# Patient Record
Sex: MALE | Race: OTHER | HISPANIC OR LATINO | URBAN - METROPOLITAN AREA
[De-identification: names, ages, dates, MRNs, and addresses within clinical notes are randomized per-mention and may not be internally consistent; named-entity substitution may affect disease eponyms.]

---

## 2018-09-28 ENCOUNTER — EMERGENCY (EMERGENCY)
Facility: HOSPITAL | Age: 38
LOS: 1 days | Discharge: HOME | End: 2018-09-28
Attending: STUDENT IN AN ORGANIZED HEALTH CARE EDUCATION/TRAINING PROGRAM
Payer: SELF-PAY

## 2018-09-28 VITALS
SYSTOLIC BLOOD PRESSURE: 132 MMHG | RESPIRATION RATE: 18 BRPM | TEMPERATURE: 98 F | HEART RATE: 59 BPM | DIASTOLIC BLOOD PRESSURE: 76 MMHG | OXYGEN SATURATION: 98 %

## 2018-09-28 VITALS — HEIGHT: 64 IN | WEIGHT: 151.9 LBS

## 2018-09-28 PROCEDURE — 99283 EMERGENCY DEPT VISIT LOW MDM: CPT

## 2018-09-28 PROCEDURE — 99283 EMERGENCY DEPT VISIT LOW MDM: CPT | Mod: 25

## 2018-09-28 PROCEDURE — 99053 MED SERV 10PM-8AM 24 HR FAC: CPT

## 2018-09-28 RX ORDER — IBUPROFEN 200 MG
600 TABLET ORAL ONCE
Qty: 0 | Refills: 0 | Status: COMPLETED | OUTPATIENT
Start: 2018-09-28 | End: 2018-09-28

## 2018-09-28 RX ADMIN — Medication 600 MILLIGRAM(S): at 23:51
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
## 2018-09-28 NOTE — ED ADULT NURSE NOTE - NSIMPLEMENTINTERV_GEN_ALL_ED
Implemented All Universal Safety Interventions:  [place] to call system. Call bell, personal items and telephone within reach. Instruct patient to call for assistance. Room bathroom lighting operational. Non-slip footwear when patient is off stretcher. Physically safe environment: no spills, clutter or unnecessary equipment. Stretcher in lowest position, wheels locked, appropriate side rails in place.
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
## 2018-09-28 NOTE — ED PROVIDER NOTE - MUSCULOSKELETAL, MLM
No midline TTP. Cervical spine TTP. Parathoracic TTP. Lumbar TTP. No midline TTP. Cervical spine TTP. Paraspinal TTP. Lumbar TTP. FROM of neck. No midline TTP. Cervical spine TTP. Paraspinal TTP. Lumbar TTP.

## 2019-04-07 ENCOUNTER — EMERGENCY (EMERGENCY)
Facility: HOSPITAL | Age: 39
LOS: 1 days | Discharge: HOME | End: 2019-04-07
Attending: EMERGENCY MEDICINE
Payer: SELF-PAY

## 2019-04-07 VITALS
DIASTOLIC BLOOD PRESSURE: 68 MMHG | TEMPERATURE: 98 F | HEIGHT: 65 IN | WEIGHT: 151.9 LBS | SYSTOLIC BLOOD PRESSURE: 107 MMHG | OXYGEN SATURATION: 98 % | HEART RATE: 59 BPM | RESPIRATION RATE: 18 BRPM

## 2019-04-07 PROCEDURE — 99283 EMERGENCY DEPT VISIT LOW MDM: CPT

## 2019-04-07 PROCEDURE — 90715 TDAP VACCINE 7 YRS/> IM: CPT

## 2019-04-07 PROCEDURE — 90471 IMMUNIZATION ADMIN: CPT

## 2019-04-07 PROCEDURE — T1013: CPT

## 2019-04-07 PROCEDURE — 99283 EMERGENCY DEPT VISIT LOW MDM: CPT | Mod: 25

## 2019-04-07 RX ORDER — TETANUS TOXOID, REDUCED DIPHTHERIA TOXOID AND ACELLULAR PERTUSSIS VACCINE, ADSORBED 5; 2.5; 8; 8; 2.5 [IU]/.5ML; [IU]/.5ML; UG/.5ML; UG/.5ML; UG/.5ML
0.5 SUSPENSION INTRAMUSCULAR ONCE
Qty: 0 | Refills: 0 | Status: COMPLETED | OUTPATIENT
Start: 2019-04-07 | End: 2019-04-07

## 2019-04-07 RX ADMIN — Medication 1 TABLET(S): at 22:54

## 2019-04-07 RX ADMIN — TETANUS TOXOID, REDUCED DIPHTHERIA TOXOID AND ACELLULAR PERTUSSIS VACCINE, ADSORBED 0.5 MILLILITER(S): 5; 2.5; 8; 8; 2.5 SUSPENSION INTRAMUSCULAR at 22:54

## 2019-04-07 NOTE — ED PROVIDER NOTE - ATTENDING CONTRIBUTION TO CARE
seen with acp: well appearing, NAD, left first digit with superficial abrasion to dorsum of hand; neurovasc intact; no flexor or extensor tendon involvement; agree with acp plan of care

## 2019-04-07 NOTE — ED PROVIDER NOTE - PHYSICAL EXAMINATION
Const: Awake, alert and oriented. In no acute distress. Well appearing.  HEENT: NC/AT. Moist mucous membranes.  Eyes: No scleral icterus. EOMI.  Neck:. Soft and supple. Full ROM without pain.  Cardiac:  +S1/S2. No murmurs. Peripheral pulses 2+ and symmetric. No LE edema.  Resp: Speaking in full sentences. No evidence of respiratory distress. No wheezes, rales or rhonchi.  Abd: Soft, non-tender, non-distended. Normal bowel sounds in all 4 quadrants. No guarding or rebound.  Back: Spine midline and non-tender. No CVAT.  MSK: FROM in all extremities, neurovasculary intact, radial pulse 2+, hand  5/5   Skin: Superificial animal bite noted to left 1st digit, no tendon involvement   Lymph: No cervical lymphadenopathy.  Neuro: Awake, alert & oriented x 3. Moves all extremities symmetrically.

## 2019-04-07 NOTE — ED PROVIDER NOTE - OBJECTIVE STATEMENT
Patient is a 39 y/o male presenting for animal bite. Patient states was bit by a neighborhood dog, states owner stated patient is up to date with vaccines, dog can be observed. Patient admits to animal bite on left 1st digit. Patient is not up to date with tetanus. Patient denies numbness or loss of sensation.

## 2019-04-07 NOTE — ED PROVIDER NOTE - CLINICAL SUMMARY MEDICAL DECISION MAKING FREE TEXT BOX
Wound care explained to patient, copious irrigation, augmentin, tetanus, dog bite form, explained to patient signs of infection, pt explained results and d/c instructions

## 2022-11-06 ENCOUNTER — EMERGENCY (EMERGENCY)
Facility: HOSPITAL | Age: 42
LOS: 1 days | Discharge: HOME | End: 2022-11-06
Attending: EMERGENCY MEDICINE
Payer: SELF-PAY

## 2022-11-06 VITALS
DIASTOLIC BLOOD PRESSURE: 81 MMHG | TEMPERATURE: 98 F | HEIGHT: 64 IN | HEART RATE: 68 BPM | OXYGEN SATURATION: 96 % | SYSTOLIC BLOOD PRESSURE: 126 MMHG | WEIGHT: 160.06 LBS | RESPIRATION RATE: 16 BRPM

## 2022-11-06 PROCEDURE — 99283 EMERGENCY DEPT VISIT LOW MDM: CPT

## 2022-11-06 PROCEDURE — 99053 MED SERV 10PM-8AM 24 HR FAC: CPT

## 2022-11-07 PROCEDURE — 99283 EMERGENCY DEPT VISIT LOW MDM: CPT

## 2022-11-07 PROCEDURE — 73562 X-RAY EXAM OF KNEE 3: CPT

## 2022-11-07 PROCEDURE — 73562 X-RAY EXAM OF KNEE 3: CPT | Mod: 26,RT

## 2022-11-07 RX ORDER — ACETAMINOPHEN 500 MG
975 TABLET ORAL ONCE
Refills: 0 | Status: COMPLETED | OUTPATIENT
Start: 2022-11-07 | End: 2022-11-07

## 2022-11-07 RX ADMIN — Medication 975 MILLIGRAM(S): at 00:09

## 2022-11-07 NOTE — ED PROVIDER NOTE - NSFOLLOWUPINSTRUCTIONS_ED_ALL_ED_FT
Return to the ED should your symptoms worsen. followup with orthopedic specialist.      Dolor magy de rodilla en los adultos    Acute Knee Pain, Adult      El dolor magy de rodilla es repentino y puede deberse a daño, hinchazón o irritación de los músculos y tejidos que sostienen la rodilla. El dolor puede ser consecuencia de lo siguiente:  •Jennifer caída.      •Jennifer lesión en la rodilla debido a movimientos de rotación.      •Un golpe en la rodilla.      •Jennifer infección.      El dolor magy de rodilla puede desaparecer solo con el tiempo y el descanso. De no ser así, couch médico podría indicarle que se naima estudios para hallar la causa del dolor. Pueden incluir:  •Estudios de diagnóstico por imágenes, jv jennifer radiografía, jennifer resonancia magnética (RM), jennifer exploración por tomografía computarizada (TC) o jennifer ecografía.      •Aspiración de jennifer articulación. En merrick estudio, se extrae líquido de la rodilla y se evalúa.      •Artroscopia. En merrick estudio, se introduce un tubo iluminado en la rodilla y se proyecta jennifer imagen en jennifer pantalla de televisión.      •Biopsia. En merrick estudio, se shira jennifer muestra de tejido del organismo y se la estudia con un microscopio.        Siga estas instrucciones en couch casa:      Si tiene jennifer rodillera o un dispositivo ortopédico:      •Use la rodillera o el dispositivo ortopédico jv se lo haya indicado el médico. Quíteselos solamente jv se lo haya indicado el médico.      •Aflójelos si siente hormigueo en los dedos del pie, se le adormecen o se le enfrían y se tornan azulados.      •Manténgalos limpios.    •Si la rodillera o el dispositivo ortopédico no son impermeables:  •No deje que se mojen.      •Cúbralos con un envoltorio hermético cuando tome un baño de inmersión o jennifer ducha.        Actividad     •Descanse la rodilla.      • No naima cosas que le causen dolor o que lo intensifiquen.      •Evite las actividades o los ejercicios de alto impacto, jv correr, saltar la soga o hacer cornelius de tijera.      •Trabaje con un fisioterapeuta para crear un programa de ejercicios seguros, según lo recomiende el médico. Naima ejercicios jv se lo haya indicado el fisioterapeuta.        Control del dolor, la rigidez y la hinchazón    •Si se lo indican, aplique hielo sobre la rodilla afectada. Para hacer esto:  •Si usa jennifer rodillera o un dispositivo ortopédico desmontables, quíteselos según las indicaciones del médico.      •Ponga el hielo en jennifer bolsa plástica.      •Coloque jennifer toalla entre la piel y la bolsa.      •Aplique el hielo mckenzie 20 minutos, 2 o 3 veces por día.      •Retire el hielo si la piel se pone de color bland brillante. Redan es muy importante. Si no puede sentir dolor, calor o frío, tiene un mayor riesgo de que se dañe la parveen.        •Si se lo indican, use jennifer venda elástica para ejercer presión (compresión) en la rodilla lesionada. Redan puede controlar la hinchazón, darle sostén y ayudar a aliviar las molestias.      •Cuando esté sentado o acostado, levante (eleve) la rodilla por encima del nivel del corazón.      •Duerma con jennifer almohada debajo de la rodilla.      Indicaciones generales     •Use los medicamentos de venta cody y los recetados solamente jv se lo haya indicado el médico.      • No consuma ningún producto que contenga nicotina o tabaco, jv cigarrillos, cigarrillos electrónicos y tabaco de mascar. Si necesita ayuda para dejar de consumir estos productos, consulte al médico.      •Si tiene sobrepeso, trabaje con el médico y un nutricionista para establecer jennifer meta de pérdida de peso que sea saludable y razonable para usted. El exceso de peso puede generar presión en la rodilla.      •Esté atento a cualquier cambio en los síntomas.      •Cumpla con todas las visitas de seguimiento. Redan es importante.        Comuníquese con un médico si:    •El dolor de rodilla continúa, cambia o empeora.      •Tiene fiebre junto con dolor de rodilla.      •La rodilla se siente caliente al tacto o está enrojecida.      •La rodilla se le tuerce o se le traba.        Solicite ayuda de inmediato si:    •La rodilla se hincha, y la hinchazón empeora.      •No puede  la rodilla.      •Tiene un dolor intenso en la rodilla que no se puede controlar con analgésicos.        Resumen    •El dolor magy de rodilla puede deberse a jennifer caída, jennifer lesión, jennifer infección o a daño, hinchazón o irritación de los tejidos que sostienen la rodilla.      •El médico podría hacerle estudios para hallar la causa del dolor.      •Esté atento a cualquier cambio en los síntomas. Alivie el dolor con descanso, medicamentos, actividad de poca intensidad y el uso de hielo.      •Obtenga ayuda de inmediato si la rodilla se hincha, no puede  la rodilla o tiene un dolor intenso que no se puede controlar con medicamentos.      Esta información no tiene jv fin reemplazar el consejo del médico. Asegúrese de hacerle al médico cualquier pregunta que tenga.
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
## 2022-11-07 NOTE — ED PROVIDER NOTE - NSFOLLOWUPCLINICS_GEN_ALL_ED_FT
[facility]  Orthopedics  [address] [place] [id number]  Phone: [phone]  Fax:   Follow Up Time: Urgent

## 2022-11-07 NOTE — ED PROVIDER NOTE - PATIENT PORTAL LINK FT
You can access the FollowMyHealth Patient Portal offered by [facility] by registering at the following website: [email or web address]. By joining [employer]’s FollowMyHealth portal, you will also be able to view your health information using other applications (apps) compatible with our system.

## 2022-11-07 NOTE — ED PROVIDER NOTE - PHYSICAL EXAMINATION
Gen: Well appearing in NAD  Head: NC/AT  Neck: trachea midline  Resp:  No distress  Ext: no deformities. R knee ttp over patella.   Neuro:  A&O appears non focal  Skin:  Warm and dry as visualized  Psych:  Normal affect and mood

## 2022-11-07 NOTE — ED PROVIDER NOTE - CLINICAL SUMMARY MEDICAL DECISION MAKING FREE TEXT BOX
41 yo male w right knee pain. Will check XR. PO Tylenol for pain control. Monitor and reassess. 43 yo male w right knee pain. Will check XR. PO Tylenol for pain control. Monitor and reassess.    Progress: XR knee negative for acute fracture. Plan to dc patient w Orthopedic followup.
